# Patient Record
Sex: FEMALE | Race: WHITE | URBAN - METROPOLITAN AREA
[De-identification: names, ages, dates, MRNs, and addresses within clinical notes are randomized per-mention and may not be internally consistent; named-entity substitution may affect disease eponyms.]

---

## 2019-08-08 ENCOUNTER — APPOINTMENT (RX ONLY)
Dept: URBAN - METROPOLITAN AREA CLINIC 16 | Facility: CLINIC | Age: 84
Setting detail: DERMATOLOGY
End: 2019-08-08

## 2019-08-08 DIAGNOSIS — L30.9 DERMATITIS, UNSPECIFIED: ICD-10-CM

## 2019-08-08 PROCEDURE — ? ORDER TESTS

## 2019-08-08 PROCEDURE — ? COUNSELING

## 2019-08-08 PROCEDURE — 11105 PUNCH BX SKIN EA SEP/ADDL: CPT

## 2019-08-08 PROCEDURE — 11104 PUNCH BX SKIN SINGLE LESION: CPT

## 2019-08-08 PROCEDURE — ? BIOPSY BY PUNCH METHOD

## 2019-08-08 PROCEDURE — ? PRESCRIPTION

## 2019-08-08 PROCEDURE — ? DIAGNOSIS COMMENT

## 2019-08-08 PROCEDURE — ? TREATMENT REGIMEN

## 2019-08-08 RX ORDER — DOXYCYCLINE HYCLATE 100 MG/1
CAPSULE, GELATIN COATED ORAL
Qty: 30 | Refills: 0 | Status: ERX | COMMUNITY
Start: 2019-08-08

## 2019-08-08 RX ORDER — MUPIROCIN 20 MG/G
OINTMENT TOPICAL
Qty: 2 | Refills: 2 | Status: ERX | COMMUNITY
Start: 2019-08-08

## 2019-08-08 RX ADMIN — DOXYCYCLINE HYCLATE: 100 CAPSULE, GELATIN COATED ORAL at 17:11

## 2019-08-08 RX ADMIN — MUPIROCIN: 20 OINTMENT TOPICAL at 17:09

## 2019-08-08 ASSESSMENT — LOCATION SIMPLE DESCRIPTION DERM
LOCATION SIMPLE: RIGHT POSTERIOR THIGH
LOCATION SIMPLE: RIGHT THIGH
LOCATION SIMPLE: RIGHT BUTTOCK

## 2019-08-08 ASSESSMENT — LOCATION DETAILED DESCRIPTION DERM
LOCATION DETAILED: RIGHT PROXIMAL POSTERIOR THIGH
LOCATION DETAILED: RIGHT BUTTOCK
LOCATION DETAILED: RIGHT ANTERIOR PROXIMAL THIGH

## 2019-08-08 ASSESSMENT — LOCATION ZONE DERM
LOCATION ZONE: TRUNK
LOCATION ZONE: LEG

## 2019-08-08 NOTE — PROCEDURE: DIAGNOSIS COMMENT
Detail Level: Zone
Comment: Dermatitis involving right buttocks and right thigh. DDx: favor herpes zoster (shingles) with likely secondary impetiginization over immunobullous disorder (bullous pemphigoid) or vasculitis. Punch biopsy and DIF performed today. Swab sent for VZV PCR and bacterial culture. Recommend completing course of valacyclovir. Add doxycycline and mupirocin ointment for secondary impetiginization, with doxycycline also anti-inflammatory. Reviewed importance of local wound care with Vaseline and mupirocin, followed by non-stick gauze and wrap over top. For neuropathic pain, patient is just starting gabapentin 600mg at night and is on trazodone prn. Pending response to gabapentin, could consider increasing dose. F/u 1 week

## 2019-08-08 NOTE — PROCEDURE: BIOPSY BY PUNCH METHOD
Wound Care: Petrolatum
Notification Instructions: Patient will be notified of biopsy results. However, patient instructed to call the office if not contacted within 2 weeks.
Path Notes Override (Will Replace All Of The Above Text): 3 week history of painful eruption with angulated erosions with hemorrhagic crusting, red non-blanching macules, flaccid bullae. Favor shingles over vasculitis or immunobullous disorder.
Epidermal Sutures: 4-0 Nylon
Was A Bandage Applied: Yes
X Depth Of Punch In Cm (Optional): 0
Biopsy Type: H and E
Hemostasis: None
Render Post-Care Instructions In Note?: no
Lab: 11
Anesthesia Type: 1% lidocaine with 1:100,000 epinephrine
Billing Type: Third-Party Bill
Dressing: bandage
Detail Level: Simple
Post-Care Instructions: I reviewed with the patient in detail post-care instructions. Patient is to keep the biopsy site dry overnight, and then apply bacitracin twice daily until healed. Patient may apply hydrogen peroxide soaks to remove any crusting.
Punch Size In Mm: 4
Home Suture Removal Text: Patient was provided a home suture removal kit and will remove their sutures at home.  If they have any questions or difficulties they will call the office.
Anesthesia Volume In Cc (Will Not Render If 0): 1.5
Consent: Written consent was obtained and risks were reviewed including but not limited to scarring, infection, bleeding, scabbing, incomplete removal, nerve damage and allergy to anesthesia.
Biopsy Type: DIF

## 2019-08-08 NOTE — HPI: SHINGLES (HERPES ZOSTER)
How Severe Are Your Shingles?: moderate
Please Check The Phrase That Best Describes Your Shingles?: focal
Is This A New Presentation, Or A Follow-Up?: Follow Up Zoster
Additional History: Patient was putting wet patches on the patient upper right leg and upper right back leg

## 2019-08-08 NOTE — PROCEDURE: TREATMENT REGIMEN
Continue Regimen: Complete course of Valtrex \\nContinue gabapentin 600mg at night - consider increasing dose pending response to this medication\\nContinue trazodone 50mg Q6hr prn as per PCP
Detail Level: Simple
Initiate Treatment: Start doxycycline 100mg in the morning - stop and call office if getting stomach upset. Take antibiotic with food and a full glass of liquid.\\nStart mupirocin 2% ointment to any open or crusted areas when dressing changes occur. Use Vaseline and then non-stick gauze and wrap over top.

## 2019-08-12 ENCOUNTER — APPOINTMENT (RX ONLY)
Dept: URBAN - METROPOLITAN AREA CLINIC 16 | Facility: CLINIC | Age: 84
Setting detail: DERMATOLOGY
End: 2019-08-12

## 2019-08-12 DIAGNOSIS — L30.9 DERMATITIS, UNSPECIFIED: ICD-10-CM

## 2019-08-12 PROCEDURE — ? PRESCRIPTION

## 2019-08-12 PROCEDURE — 99213 OFFICE O/P EST LOW 20 MIN: CPT

## 2019-08-12 PROCEDURE — ? TREATMENT REGIMEN

## 2019-08-12 PROCEDURE — ? COUNSELING

## 2019-08-12 PROCEDURE — ? DIAGNOSIS COMMENT

## 2019-08-12 RX ORDER — MUPIROCIN 20 MG/G
OINTMENT TOPICAL
Qty: 2 | Refills: 2 | Status: ERX

## 2019-08-12 RX ORDER — BISMUTH TRIBROMOPH/PETROLATUM 2" X 2"
BANDAGE TOPICAL
Qty: 1 | Refills: 1 | Status: ERX | COMMUNITY
Start: 2019-08-12

## 2019-08-12 RX ADMIN — Medication: at 19:31

## 2019-08-12 ASSESSMENT — LOCATION SIMPLE DESCRIPTION DERM
LOCATION SIMPLE: RIGHT POSTERIOR THIGH
LOCATION SIMPLE: RIGHT BUTTOCK
LOCATION SIMPLE: RIGHT THIGH

## 2019-08-12 ASSESSMENT — LOCATION ZONE DERM
LOCATION ZONE: TRUNK
LOCATION ZONE: LEG

## 2019-08-12 ASSESSMENT — LOCATION DETAILED DESCRIPTION DERM
LOCATION DETAILED: RIGHT ANTERIOR PROXIMAL THIGH
LOCATION DETAILED: RIGHT PROXIMAL POSTERIOR THIGH
LOCATION DETAILED: RIGHT BUTTOCK

## 2019-08-12 NOTE — PROCEDURE: TREATMENT REGIMEN
Detail Level: Simple
Continue Regimen: Continue doxycycline 100mg in the morning \\nContinue mupirocin 2% ointment to any open or crusted areas when dressing changes occur. Use Vaseline and then non-stick gauze and wrap over top.\\n\\nContinue gabapentin 600mg at night - consider increasing dose pending response to this medication\\nContinue trazodone 50mg Q6hr prn as per PCP

## 2019-08-12 NOTE — PROCEDURE: DIAGNOSIS COMMENT
Comment: Dermatitis involving right buttocks and right thigh, improved from prior visit. DDx: favor herpes zoster (shingles) with secondary impetiginization over immunobullous disorder (bullous pemphigoid) or vasculitis. \\n\\nBiopsy results and viral PCR still pending. Bacterial culture results with heavy growth of E. Coli and Staph aureus susceptible to tetracycline. Patient is clinically improving compared to prior visit with local wound care and doxycycline orally - advised to continue. She completed full course of valacyclovir.\\n\\nPatient reports improvement in neuropathic pain. Given that she has mild somnolence at baseline and has had improvement in her pain, will hold off on increasing dose of gabapentin for now.
Detail Level: Zone

## 2019-08-26 ENCOUNTER — APPOINTMENT (RX ONLY)
Dept: URBAN - METROPOLITAN AREA CLINIC 16 | Facility: CLINIC | Age: 84
Setting detail: DERMATOLOGY
End: 2019-08-26

## 2019-08-26 DIAGNOSIS — B02.29 OTHER POSTHERPETIC NERVOUS SYSTEM INVOLVEMENT: ICD-10-CM

## 2019-08-26 DIAGNOSIS — B02.9 ZOSTER WITHOUT COMPLICATIONS: ICD-10-CM | Status: RESOLVING

## 2019-08-26 PROCEDURE — ? COUNSELING

## 2019-08-26 PROCEDURE — ? TREATMENT REGIMEN

## 2019-08-26 PROCEDURE — ? PRESCRIPTION

## 2019-08-26 PROCEDURE — 99213 OFFICE O/P EST LOW 20 MIN: CPT

## 2019-08-26 PROCEDURE — ? MEDICATION COUNSELING

## 2019-08-26 PROCEDURE — ? DIAGNOSIS COMMENT

## 2019-08-26 RX ORDER — GABAPENTIN 100 MG/1
CAPSULE ORAL
Qty: 240 | Refills: 0 | Status: ERX | COMMUNITY
Start: 2019-08-26

## 2019-08-26 RX ADMIN — GABAPENTIN: 100 CAPSULE ORAL at 17:40

## 2019-08-26 ASSESSMENT — LOCATION DETAILED DESCRIPTION DERM
LOCATION DETAILED: RIGHT BUTTOCK
LOCATION DETAILED: RIGHT PROXIMAL POSTERIOR THIGH
LOCATION DETAILED: RIGHT ANTERIOR DISTAL THIGH
LOCATION DETAILED: RIGHT ANTERIOR PROXIMAL THIGH

## 2019-08-26 ASSESSMENT — LOCATION ZONE DERM
LOCATION ZONE: TRUNK
LOCATION ZONE: LEG

## 2019-08-26 ASSESSMENT — LOCATION SIMPLE DESCRIPTION DERM
LOCATION SIMPLE: RIGHT BUTTOCK
LOCATION SIMPLE: RIGHT POSTERIOR THIGH
LOCATION SIMPLE: RIGHT THIGH

## 2019-08-26 NOTE — PROCEDURE: TREATMENT REGIMEN
Detail Level: Simple
Continue Regimen: Finish course of doxycycline 100mg in the morning and then stop \\nApply mupirocin 2% ointment to any open or crusted areas when dressing changes occur. No need to keep wound covered further. \\n\\nModify gabapentin dosing as below
Initiate Treatment: Add gabapentin 100mg in the morning and 100mg at lunch. Continue gabapentin 600mg at night. \\nIf still having burning pain after 1 week and not too drowsy, then increase dose to gabapentin 200mg in the morning, 200mg at lunch, and continue gabapentin 600mg at night. \\nRecommend referral to Neurology for further evaluation and treatment.

## 2019-08-26 NOTE — PROCEDURE: MEDICATION COUNSELING
Xelmaikz Pregnancy And Lactation Text: This medication is Pregnancy Category D and is not considered safe during pregnancy.  The risk during breast feeding is also uncertain.

## 2019-08-26 NOTE — PROCEDURE: DIAGNOSIS COMMENT
Detail Level: Zone
Comment: Herpes zoster (shingles) with secondary impetiginization essentially resolved on exam today, only a focal area of crusting remaining on right anterior thigh, otherwise extensive post-inflammatory erythema. \\n\\nPatient is having a lot of burning pain throughout her right lower extremity due to post-herpetic neuralgia - treatment as below. \\n\\nAdvised to RTC if rash recurs
Comment: Patient with worsening post-herpetic neuralgia of right lower extremity. Discussed treatment options in detail with patient and her daughter today. She does have somnolence at baseline so caution with increasing dose of gabapentin. Discussed modifying gabapentin regimen as below. Also recommend referral to Neurology for further evaluation and treatment - advised to call if any difficulty arranging appointment. Advised to call office if she has improvement with modifying dose of gabapentin.
Detail Level: Simple

## 2019-08-26 NOTE — PROCEDURE: MEDICATION COUNSELING
Addended by: VALDEZ RAMOS on: 9/5/2018 08:26 AM     Modules accepted: Orders     Hydroxyzine Counseling: Patient advised that the medication is sedating and not to drive a car after taking this medication.  Patient informed of potential adverse effects including but not limited to dry mouth, urinary retention, and blurry vision.  The patient verbalized understanding of the proper use and possible adverse effects of hydroxyzine.  All of the patient's questions and concerns were addressed.

## 2020-01-08 NOTE — PROCEDURE: MEDICATION COUNSELING
1.55 Cyclophosphamide Pregnancy And Lactation Text: This medication is Pregnancy Category D and it isn't considered safe during pregnancy. This medication is excreted in breast milk.

## 2025-06-04 NOTE — PROCEDURE: MEDICATION COUNSELING
Ran out of wellbutrin   Refilled same  Orders:    buPROPion (WELLBUTRIN SR) 100 mg 12 hr tablet; Take 1 tablet (100 mg total) by mouth 2 (two) times a day     Griseofulvin Counseling:  I discussed with the patient the risks of griseofulvin including but not limited to photosensitivity, cytopenia, liver damage, nausea/vomiting and severe allergy.  The patient understands that this medication is best absorbed when taken with a fatty meal (e.g., ice cream or french fries).